# Patient Record
Sex: MALE | Race: WHITE | URBAN - METROPOLITAN AREA
[De-identification: names, ages, dates, MRNs, and addresses within clinical notes are randomized per-mention and may not be internally consistent; named-entity substitution may affect disease eponyms.]

---

## 2017-02-01 ENCOUNTER — RADIANT APPOINTMENT (OUTPATIENT)
Dept: GENERAL RADIOLOGY | Facility: CLINIC | Age: 22
End: 2017-02-01
Attending: FAMILY MEDICINE
Payer: MEDICAID

## 2017-02-01 ENCOUNTER — ALLIED HEALTH/NURSE VISIT (OUTPATIENT)
Dept: FAMILY MEDICINE | Facility: CLINIC | Age: 22
End: 2017-02-01
Payer: MEDICAID

## 2017-02-01 VITALS
TEMPERATURE: 99.4 F | HEART RATE: 97 BPM | HEIGHT: 72 IN | SYSTOLIC BLOOD PRESSURE: 108 MMHG | OXYGEN SATURATION: 97 % | WEIGHT: 242.3 LBS | RESPIRATION RATE: 16 BRPM | BODY MASS INDEX: 32.82 KG/M2 | DIASTOLIC BLOOD PRESSURE: 66 MMHG

## 2017-02-01 DIAGNOSIS — R07.89 ATYPICAL CHEST PAIN: ICD-10-CM

## 2017-02-01 DIAGNOSIS — R11.2 NAUSEA AND VOMITING, INTRACTABILITY OF VOMITING NOT SPECIFIED, UNSPECIFIED VOMITING TYPE: ICD-10-CM

## 2017-02-01 DIAGNOSIS — R68.89 FLU-LIKE SYMPTOMS: Primary | ICD-10-CM

## 2017-02-01 DIAGNOSIS — R07.0 THROAT PAIN: ICD-10-CM

## 2017-02-01 DIAGNOSIS — J10.1 INFLUENZA B: ICD-10-CM

## 2017-02-01 DIAGNOSIS — R68.89 FLU-LIKE SYMPTOMS: ICD-10-CM

## 2017-02-01 DIAGNOSIS — J45.901 ASTHMA WITH ACUTE EXACERBATION, UNSPECIFIED ASTHMA SEVERITY: ICD-10-CM

## 2017-02-01 LAB
DEPRECATED S PYO AG THROAT QL EIA: NORMAL
FLUAV+FLUBV AG SPEC QL: ABNORMAL
FLUAV+FLUBV AG SPEC QL: ABNORMAL
MICRO REPORT STATUS: NORMAL
SPECIMEN SOURCE: ABNORMAL
SPECIMEN SOURCE: NORMAL

## 2017-02-01 PROCEDURE — 87804 INFLUENZA ASSAY W/OPTIC: CPT | Performed by: FAMILY MEDICINE

## 2017-02-01 PROCEDURE — 87081 CULTURE SCREEN ONLY: CPT | Performed by: FAMILY MEDICINE

## 2017-02-01 PROCEDURE — 87880 STREP A ASSAY W/OPTIC: CPT | Performed by: FAMILY MEDICINE

## 2017-02-01 PROCEDURE — 71020 XR CHEST 2 VW: CPT

## 2017-02-01 PROCEDURE — 99214 OFFICE O/P EST MOD 30 MIN: CPT | Performed by: FAMILY MEDICINE

## 2017-02-01 RX ORDER — ALBUTEROL SULFATE 90 UG/1
2 AEROSOL, METERED RESPIRATORY (INHALATION) EVERY 6 HOURS PRN
Qty: 1 INHALER | Refills: 0 | Status: SHIPPED | OUTPATIENT
Start: 2017-02-01

## 2017-02-01 RX ORDER — ALBUTEROL SULFATE 0.83 MG/ML
1 SOLUTION RESPIRATORY (INHALATION) EVERY 4 HOURS PRN
Qty: 25 VIAL | Refills: 1 | Status: SHIPPED | OUTPATIENT
Start: 2017-02-01

## 2017-02-01 RX ORDER — ONDANSETRON 4 MG/1
4-8 TABLET, ORALLY DISINTEGRATING ORAL EVERY 8 HOURS PRN
Qty: 20 TABLET | Refills: 1 | Status: SHIPPED | OUTPATIENT
Start: 2017-02-01

## 2017-02-01 ASSESSMENT — PAIN SCALES - GENERAL: PAINLEVEL: EXTREME PAIN (9)

## 2017-02-01 NOTE — PATIENT INSTRUCTIONS
Drink 1 teaspoon of water every 5 minutes .     Take 1-2 tablet of Zofran every 8 hours as needed for nausea.    Take Ibuprofen for symptom management.     Take 1 capsule(20mg) of Prilosec daily for discomfort with swallowing.     Use albuterol nebulizer every 4 hours as needed.     If symptoms worsen, follow up.

## 2017-02-01 NOTE — PROGRESS NOTES
SUBJECTIVE:                                                    Manan Lay is a 21 year old male who presents to clinic today for the following health issues:  Patient walked into the clinic today with the following concerns. He was double booked into the schedule    Acute Illness   Acute illness concerns: flu sx's  Onset: 1/28/17 -      Fever: YES-     Chills/Sweats: YES    Headache (location?): YES    Sinus Pressure:YES    Conjunctivitis:  no    Ear Pain: YES-     Rhinorrhea: no     Congestion: no     Sore Throat: YES     Cough: YES-non-productive    Wheeze: YES    Decreased Appetite: YES    Nausea: YES    Vomiting: YES    Diarrhea:  YES    Dysuria/Freq.: no     Fatigue/Achiness: YES    Sick/Strep Exposure: no     Therapies Tried and outcome: Tylenol - no help, couldn't keep it down    Manan has been feeling ill since Saturday night(4 days ago).  He has been experiencing headaches, decrease appetite,cough,sore threat, body aches, vomiting, and sharp stabbing chest pain, and pain when he swallows. The pain feels like heartburn he has had in the past..The chest pain improves with leaning back but laying down at night makes it worse.      He is vomiting 2-3x a day.He is unable to keep down food and medication down but he is able to keep down small sips of water. He is not urinating frequently- he urinated 1-2x yesterday and his urine stream was decreased and dark. He is having diarrhea about 1x a day.     Highest temp was 101 on Saturday night.     Hx of asthma. He has been wheezing and experiencing SOB. Has not been taking Flovent or using albuterol inhaler.     Additional Notes  -He has a wisdom tooth coming in and states it is causing some of his headaches.     Problem list and histories reviewed & adjusted, as indicated.  Additional history: as documented    Patient Active Problem List   Diagnosis     Asthma exacerbation     No past surgical history on file.    Social History   Substance Use Topics      Smoking status: Never Smoker      Smokeless tobacco: Not on file     Alcohol Use: Not on file     No family history on file.      Current Outpatient Prescriptions   Medication Sig Dispense Refill     ibuprofen (ADVIL,MOTRIN) 800 MG tablet Take 1 tablet (800 mg) by mouth every 8 hours as needed for moderate pain 30 tablet 1     MAGIC MOUTHWASH, ENTER INGREDIENTS IN COMMENTS, Swish and spit 5-10 mLs in mouth every 6 hours as needed Pharmacy please compound 4 grams of carafate susp in 30 ml of Benadryl (12.5 mg/5 ml), 60 ml Maalox and 30 ml Viscous Lidocaine 160 mL 0     traMADol (ULTRAM) 50 MG tablet Take 1 tablet (50 mg) by mouth every 6 hours as needed for pain (knee pain) 28 tablet 0     fluticasone (FLOVENT HFA) 220 MCG/ACT inhaler Inhale 2 puffs into the lungs 2 times daily       albuterol (PROAIR HFA, PROVENTIL HFA, VENTOLIN HFA) 108 (90 BASE) MCG/ACT inhaler Inhale 2 puffs into the lungs every 6 hours as needed for shortness of breath / dyspnea or wheezing 1 Inhaler 0     montelukast (SINGULAIR) 10 MG tablet Take 1 tablet (10 mg) by mouth every evening 30 tablet 0     cetirizine (ZYRTEC) 10 MG tablet Take 10 mg by mouth every morning       ibuprofen (ADVIL,MOTRIN) 200 MG tablet Take 400-600 mg by mouth every 8 hours as needed for mild pain       Allergies   Allergen Reactions     Morphine        ROS:  Constitutional, HEENT, cardiovascular, pulmonary, gi and gu systems are negative, except as otherwise noted.    OBJECTIVE:                                                    /66 mmHg  Pulse 97  Temp(Src) 99.4  F (37.4  C) (Oral)  Resp 16  Ht 1.829 m (6')  Wt 109.907 kg (242 lb 4.8 oz)  BMI 32.85 kg/m2  SpO2 97%  Body mass index is 32.85 kg/(m^2).  GENERAL: healthy, alert and no distress  HENT: ear canals normal, fluid present behind ear drums bilaterally, nasal mucosal edema with whitish discharge present and posterior pharynx erythema.   NECK: shotty anterior and posterior cervical adenopathy, no  asymmetry, masses, or scars and thyroid normal to palpation  RESP: expiratory wheezing-left lower lung field - no rales, rhonchi. No retractions, able to talk in complete sentences  CV: regular rate and rhythm, normal S1 S2, no S3 or S4, no murmur, click or rub, no peripheral edema and peripheral pulses strong  ABDOMEN: soft, mild ambrosio umbilical tenderness to palpation-no guarding or rebound, no hepatosplenomegaly, no masses and bowel sounds normal  MS: no gross musculoskeletal defects noted, no edema  PSYCH: mentation appears normal, affect normal/bright      Diagnostic Test Results:  Results for orders placed or performed in visit on 02/01/17 (from the past 24 hour(s))   Influenza A/B antigen   Result Value Ref Range    Influenza A/B Agn Specimen Nasal     Influenza A  NEG     Negative   Test results must be correlated with clinical data. If necessary, results   should be confirmed by a molecular assay or viral culture.      Influenza B (A) NEG     Positive   Test results must be correlated with clinical data. If necessary, results   should be confirmed by a molecular assay or viral culture.     Strep, Rapid Screen   Result Value Ref Range    Specimen Description Throat     Rapid Strep A Screen       NEGATIVE: No Group A streptococcal antigen detected by immunoassay, await   culture report.      Micro Report Status FINAL 02/01/2017        Chest X-ray: Negative. Xray personally reviewed and evaluated by me       ASSESSMENT/PLAN:                                                        1. Flu-like symptoms- INFLUENZA B on Testing  2. Throat pain  3. Atypical chest pain  Discussed symptomatic sx management with Ibuprofen, Prilosec, and Zofran. Reviewed importance of frequent hydration with small sips of water, popsicles, or ice chips to prevent dehydration. May need ER and IV fluid hydration if not able to get more fluids in. Reviewed symptomatic management of symptoms. Patient education provided, including expected  course of illness and symptoms that may occur which would require urgent evalution. All questions answered.   Patient understands and agrees with plan.    - Influenza A/B antigen  - XR Chest 2 Views; Future  - albuterol (2.5 MG/3ML) 0.083% neb solution; Take 1 vial (2.5 mg) by nebulization every 4 hours as needed for shortness of breath / dyspnea or wheezing  Dispense: 25 vial; Refill: 1  - Strep, Rapid Screen  - Beta strep group A culture  - omeprazole (PRILOSEC) 20 MG CR capsule; Take 1 capsule (20 mg) by mouth daily  Dispense: 30 capsule; Refill: 1    4. Nausea and vomiting, intractability of vomiting not specified, unspecified vomiting type  Will treat with Zofran. Reviewed symptom management as above.   - ondansetron (ZOFRAN ODT) 4 MG ODT tab; Take 1-2 tablets (4-8 mg) by mouth every 8 hours as needed for nausea  Dispense: 20 tablet; Refill: 1  - omeprazole (PRILOSEC) 20 MG CR capsule; Take 1 capsule (20 mg) by mouth daily  Dispense: 30 capsule; Refill: 1    5. Asthma with acute exacerbation, unspecified asthma severity  Use albuterol inhaler PRN for SOB and cough. Restart flovent.   - albuterol (PROAIR HFA/PROVENTIL HFA/VENTOLIN HFA) 108 (90 BASE) MCG/ACT Inhaler; Inhale 2 puffs into the lungs every 6 hours as needed for shortness of breath / dyspnea or wheezing  Dispense: 1 Inhaler; Refill: 0  - albuterol (2.5 MG/3ML) 0.083% neb solution; Take 1 vial (2.5 mg) by nebulization every 4 hours as needed for shortness of breath / dyspnea or wheezing  Dispense: 25 vial; Refill: 1    See Patient Instructions  Patient Instructions   Drink 1 teaspoon of water every 5 minutes .     Take 1-2 tablet of Zofran every 8 hours as needed for nausea.    Take Ibuprofen for symptom management.     Take 1 capsule(20mg) of Prilosec daily for discomfort with swallowing.     Use albuterol nebulizer every 4 hours as needed.     If symptoms worsen, follow up.                   This document serves as a record of the services and  decisions personally performed and made by Danielle Joy MD. It was created on her behalf by Jasmina Sidhu,a trained medical scribe. The creation of this document is based the provider's statements to the medical scribe.  Jasmina Sidhu February 1, 2017 11:20 AM       Danielle Joy MD  [facility]

## 2017-02-01 NOTE — NURSING NOTE
Chief Complaint   Patient presents with     Chest Pain     1/29/17 -      Flu     sx's       Initial /66 mmHg  Pulse 97  Temp(Src) 99.4  F (37.4  C) (Oral)  Resp 16  Ht 1.829 m (6')  Wt 109.907 kg (242 lb 4.8 oz)  BMI 32.85 kg/m2  SpO2 97% Estimated body mass index is 32.85 kg/(m^2) as calculated from the following:    Height as of this encounter: 1.829 m (6').    Weight as of this encounter: 109.907 kg (242 lb 4.8 oz).  BP completed using cuff size: ricarda Swartz MA      
---

## 2017-02-01 NOTE — MR AVS SNAPSHOT
"              After Visit Summary   2/1/2017    Manan Lay    MRN: [id number]           Patient Information     Date Of Birth          1995        Visit Information        Provider Department      2/1/2017 10:40 AM Danielle Joy MD [facility]        Today's Diagnoses     Flu-like symptoms    -  1     Throat pain         Atypical chest pain         Nausea and vomiting, intractability of vomiting not specified, unspecified vomiting type         Asthma with acute exacerbation, unspecified asthma severity           Care Instructions    Drink 1 teaspoon of water every 5 minutes .     Take 1-2 tablet of Zofran every 8 hours as needed for nausea.    Take Ibuprofen for symptom management.     Take 1 capsule(20mg) of Prilosec daily for discomfort with swallowing.     Use albuterol nebulizer every 4 hours as needed.     If symptoms worsen, follow up.                     Follow-ups after your visit        Who to contact     If you have questions or need follow up information about today's clinic visit or your schedule please contact [facility] directly at [phone].  Normal or non-critical lab and imaging results will be communicated to you by Mingleversehart, letter or phone within 4 business days after the clinic has received the results. If you do not hear from us within 7 days, please contact the clinic through HALSCIONt or phone. If you have a critical or abnormal lab result, we will notify you by phone as soon as possible.  Submit refill requests through Conversocial or call your pharmacy and they will forward the refill request to us. Please allow 3 business days for your refill to be completed.          Additional Information About Your Visit        Mingleversehart Information     Conversocial lets you send messages to your doctor, view your test results, renew your prescriptions, schedule appointments and more. To sign up, go to [email or web address] . Click on \"Log in\" on the left " "side of the screen, which will take you to the Welcome page. Then click on \"Sign up Now\" on the right side of the page.     You will be asked to enter the access code listed below, as well as some personal information. Please follow the directions to create your username and password.     Your access code is: [email or web address]  Expires: 2017 11:50 AM     Your access code will  in 90 days. If you need help or a new code, please call your [facility] clinic or [phone].        Care EveryWhere ID     This is your Care EveryWhere ID. This could be used by other organizations to access your [facility] medical records  [id number]        Your Vitals Were     Pulse Temperature Respirations Height BMI (Body Mass Index) Pulse Oximetry    97 99.4  F (37.4  C) (Oral) 16 1.829 m (6') 32.85 kg/m2 97%       Blood Pressure from Last 3 Encounters:   17 108/66   03/15/16 116/68   16 139/74    Weight from Last 3 Encounters:   17 109.907 kg (242 lb 4.8 oz)   03/15/16 112.946 kg (249 lb)   16 111.3 kg (245 lb 6 oz)              We Performed the Following     Beta strep group A culture     Influenza A/B antigen     Strep, Rapid Screen          Today's Medication Changes          These changes are accurate as of: 17 11:50 AM.  If you have any questions, ask your nurse or doctor.               Start taking these medicines.        Dose/Directions    omeprazole 20 MG CR capsule   Commonly known as:  priLOSEC   Used for:  Atypical chest pain, Nausea and vomiting, intractability of vomiting not specified, unspecified vomiting type   Started by:  Danielle Joy MD        Dose:  20 mg   Take 1 capsule (20 mg) by mouth daily   Quantity:  30 capsule   Refills:  1       ondansetron 4 MG ODT tab   Commonly known as:  ZOFRAN ODT   Used for:  Nausea and vomiting, intractability of vomiting not specified, unspecified vomiting type   Started by:  Danielle Joy MD        Dose:  4-8 mg   Take 1-2 " tablets (4-8 mg) by mouth every 8 hours as needed for nausea   Quantity:  20 tablet   Refills:  1         These medicines have changed or have updated prescriptions.        Dose/Directions    * albuterol 108 (90 BASE) MCG/ACT Inhaler   Commonly known as:  PROAIR HFA/PROVENTIL HFA/VENTOLIN HFA   This may have changed:  Another medication with the same name was added. Make sure you understand how and when to take each.   Used for:  Asthma with acute exacerbation, unspecified asthma severity   Changed by:  Danielle Joy MD        Dose:  2 puff   Inhale 2 puffs into the lungs every 6 hours as needed for shortness of breath / dyspnea or wheezing   Quantity:  1 Inhaler   Refills:  0       * albuterol (2.5 MG/3ML) 0.083% neb solution   This may have changed:  You were already taking a medication with the same name, and this prescription was added. Make sure you understand how and when to take each.   Used for:  Flu-like symptoms, Asthma with acute exacerbation, unspecified asthma severity   Changed by:  Danielle Joy MD        Dose:  1 vial   Take 1 vial (2.5 mg) by nebulization every 4 hours as needed for shortness of breath / dyspnea or wheezing   Quantity:  25 vial   Refills:  1       * Notice:  This list has 2 medication(s) that are the same as other medications prescribed for you. Read the directions carefully, and ask your doctor or other care provider to review them with you.         Where to get your medicines      These medications were sent to [facility]/pharmacy #[id number] - [facility] [address] [place] [address], [place] AT [address] [address]     Phone:  [phone]    - albuterol (2.5 MG/3ML) 0.083% neb solution  - albuterol 108 (90 BASE) MCG/ACT Inhaler  - omeprazole 20 MG CR capsule  - ondansetron 4 MG ODT tab             Primary Care Provider    None Doctor, MD       No address on file        Thank you!     Thank you for choosing [place]  [facility]  for your care. Our goal is always to provide you with excellent care. Hearing back from our patients is one way we can continue to improve our services. Please take a few minutes to complete the written survey that you may receive in the mail after your visit with us. Thank you!             Your Updated Medication List - Protect others around you: Learn how to safely use, store and throw away your medicines at www.disposemymeds.org.          This list is accurate as of: 2/1/17 11:50 AM.  Always use your most recent med list.                   Brand Name Dispense Instructions for use    * albuterol 108 (90 BASE) MCG/ACT Inhaler    PROAIR HFA/PROVENTIL HFA/VENTOLIN HFA    1 Inhaler    Inhale 2 puffs into the lungs every 6 hours as needed for shortness of breath / dyspnea or wheezing       * albuterol (2.5 MG/3ML) 0.083% neb solution     25 vial    Take 1 vial (2.5 mg) by nebulization every 4 hours as needed for shortness of breath / dyspnea or wheezing       cetirizine 10 MG tablet    zyrTEC     Take 10 mg by mouth every morning       fluticasone 220 MCG/ACT Inhaler    FLOVENT HFA     Inhale 2 puffs into the lungs 2 times daily       * ibuprofen 200 MG tablet    ADVIL/MOTRIN     Take 400-600 mg by mouth every 8 hours as needed for mild pain       * ibuprofen 800 MG tablet    ADVIL/MOTRIN    30 tablet    Take 1 tablet (800 mg) by mouth every 8 hours as needed for moderate pain       MAGIC MOUTHWASH (ENTER INGREDIENTS IN COMMENTS)     160 mL    Swish and spit 5-10 mLs in mouth every 6 hours as needed Pharmacy please compound 4 grams of carafate susp in 30 ml of Benadryl (12.5 mg/5 ml), 60 ml Maalox and 30 ml Viscous Lidocaine       montelukast 10 MG tablet    SINGULAIR    30 tablet    Take 1 tablet (10 mg) by mouth every evening       omeprazole 20 MG CR capsule    priLOSEC    30 capsule    Take 1 capsule (20 mg) by mouth daily       ondansetron 4 MG ODT tab    ZOFRAN ODT    20 tablet    Take 1-2  tablets (4-8 mg) by mouth every 8 hours as needed for nausea       traMADol 50 MG tablet    ULTRAM    28 tablet    Take 1 tablet (50 mg) by mouth every 6 hours as needed for pain (knee pain)       * Notice:  This list has 4 medication(s) that are the same as other medications prescribed for you. Read the directions carefully, and ask your doctor or other care provider to review them with you.

## 2017-02-02 PROBLEM — J45.40 MODERATE PERSISTENT ASTHMA: Status: ACTIVE | Noted: 2017-02-02

## 2017-02-03 ENCOUNTER — HOSPITAL ENCOUNTER (EMERGENCY)
Facility: CLINIC | Age: 22
Discharge: HOME | End: 2017-02-03
Attending: EMERGENCY MEDICINE | Admitting: EMERGENCY MEDICINE
Payer: MEDICAID

## 2017-02-03 ENCOUNTER — APPOINTMENT (OUTPATIENT)
Dept: GENERAL RADIOLOGY | Facility: CLINIC | Age: 22
End: 2017-02-03
Attending: EMERGENCY MEDICINE
Payer: MEDICAID

## 2017-02-03 VITALS
HEART RATE: 89 BPM | OXYGEN SATURATION: 97 % | TEMPERATURE: 98.3 F | DIASTOLIC BLOOD PRESSURE: 76 MMHG | SYSTOLIC BLOOD PRESSURE: 123 MMHG | RESPIRATION RATE: 18 BRPM

## 2017-02-03 DIAGNOSIS — R07.9 ACUTE CHEST PAIN: ICD-10-CM

## 2017-02-03 DIAGNOSIS — K20.90 ESOPHAGITIS: ICD-10-CM

## 2017-02-03 DIAGNOSIS — J10.1 INFLUENZA B: ICD-10-CM

## 2017-02-03 LAB
ANION GAP SERPL CALCULATED.3IONS-SCNC: 4 MMOL/L (ref 3–14)
BACTERIA SPEC CULT: NORMAL
BASOPHILS # BLD AUTO: 0 10E9/L (ref 0–0.2)
BASOPHILS NFR BLD AUTO: 0.5 %
BUN SERPL-MCNC: 14 MG/DL (ref 7–30)
CALCIUM SERPL-MCNC: 9.2 MG/DL (ref 8.5–10.1)
CHLORIDE SERPL-SCNC: 105 MMOL/L (ref 94–109)
CO2 SERPL-SCNC: 31 MMOL/L (ref 20–32)
CREAT SERPL-MCNC: 1.01 MG/DL (ref 0.66–1.25)
DIFFERENTIAL METHOD BLD: ABNORMAL
EOSINOPHIL # BLD AUTO: 0 10E9/L (ref 0–0.7)
EOSINOPHIL NFR BLD AUTO: 0.6 %
ERYTHROCYTE [DISTWIDTH] IN BLOOD BY AUTOMATED COUNT: 11.9 % (ref 10–15)
GFR SERPL CREATININE-BSD FRML MDRD: NORMAL ML/MIN/1.7M2
GLUCOSE SERPL-MCNC: 89 MG/DL (ref 70–99)
HCT VFR BLD AUTO: 46.3 % (ref 40–53)
HGB BLD-MCNC: 16.2 G/DL (ref 13.3–17.7)
IMM GRANULOCYTES # BLD: 0 10E9/L (ref 0–0.4)
IMM GRANULOCYTES NFR BLD: 0.2 %
INTERPRETATION ECG - MUSE: NORMAL
LYMPHOCYTES # BLD AUTO: 1.2 10E9/L (ref 0.8–5.3)
LYMPHOCYTES NFR BLD AUTO: 18.5 %
MCH RBC QN AUTO: 30.9 PG (ref 26.5–33)
MCHC RBC AUTO-ENTMCNC: 35 G/DL (ref 31.5–36.5)
MCV RBC AUTO: 88 FL (ref 78–100)
MICRO REPORT STATUS: NORMAL
MONOCYTES # BLD AUTO: 0.7 10E9/L (ref 0–1.3)
MONOCYTES NFR BLD AUTO: 11.8 %
NEUTROPHILS # BLD AUTO: 4.3 10E9/L (ref 1.6–8.3)
NEUTROPHILS NFR BLD AUTO: 68.4 %
NRBC # BLD AUTO: 0 10*3/UL
NRBC BLD AUTO-RTO: 0 /100
PLATELET # BLD AUTO: 129 10E9/L (ref 150–450)
PLATELET # BLD EST: NORMAL 10*3/UL
POTASSIUM SERPL-SCNC: 3.9 MMOL/L (ref 3.4–5.3)
RBC # BLD AUTO: 5.25 10E12/L (ref 4.4–5.9)
RBC MORPH BLD: ABNORMAL
SODIUM SERPL-SCNC: 140 MMOL/L (ref 133–144)
SPECIMEN SOURCE: NORMAL
VARIANT LYMPHS BLD QL SMEAR: PRESENT
WBC # BLD AUTO: 6.2 10E9/L (ref 4–11)

## 2017-02-03 PROCEDURE — 71020 XR CHEST 2 VW: CPT

## 2017-02-03 PROCEDURE — 96361 HYDRATE IV INFUSION ADD-ON: CPT

## 2017-02-03 PROCEDURE — 99285 EMERGENCY DEPT VISIT HI MDM: CPT | Mod: 25

## 2017-02-03 PROCEDURE — 93005 ELECTROCARDIOGRAM TRACING: CPT

## 2017-02-03 PROCEDURE — 80048 BASIC METABOLIC PNL TOTAL CA: CPT | Performed by: EMERGENCY MEDICINE

## 2017-02-03 PROCEDURE — 25000132 ZZH RX MED GY IP 250 OP 250 PS 637: Performed by: EMERGENCY MEDICINE

## 2017-02-03 PROCEDURE — 96374 THER/PROPH/DIAG INJ IV PUSH: CPT

## 2017-02-03 PROCEDURE — 25000125 ZZHC RX 250: Performed by: EMERGENCY MEDICINE

## 2017-02-03 PROCEDURE — 85025 COMPLETE CBC W/AUTO DIFF WBC: CPT | Performed by: EMERGENCY MEDICINE

## 2017-02-03 PROCEDURE — 25000128 H RX IP 250 OP 636: Performed by: EMERGENCY MEDICINE

## 2017-02-03 RX ORDER — ONDANSETRON 2 MG/ML
4 INJECTION INTRAMUSCULAR; INTRAVENOUS ONCE
Status: COMPLETED | OUTPATIENT
Start: 2017-02-03 | End: 2017-02-03

## 2017-02-03 RX ADMIN — LIDOCAINE HYDROCHLORIDE 30 ML: 20 SOLUTION ORAL; TOPICAL at 11:22

## 2017-02-03 RX ADMIN — ONDANSETRON 4 MG: 2 INJECTION INTRAMUSCULAR; INTRAVENOUS at 11:27

## 2017-02-03 RX ADMIN — SODIUM CHLORIDE 1000 ML: 9 INJECTION, SOLUTION INTRAVENOUS at 11:27

## 2017-02-03 ASSESSMENT — ENCOUNTER SYMPTOMS
ABDOMINAL PAIN: 1
LIGHT-HEADEDNESS: 1
SHORTNESS OF BREATH: 0
COUGH: 1
VOMITING: 1
NAUSEA: 1
WHEEZING: 1
SORE THROAT: 1

## 2017-02-03 NOTE — ED AVS SNAPSHOT
[facility] Emergency [facility]    [address]    [facility] [address]    Phone:  [phone]    Fax:  [phone]                                       Manan Lay   MRN: [id number]    Department:  [facility] Emergency Department   Date of Visit:  2/3/2017           After Visit Summary Signature Page     I have received my discharge instructions, and my questions have been answered. I have discussed any challenges I see with this plan with the nurse or doctor.    ..........................................................................................................................................  Patient/Patient Representative Signature      ..........................................................................................................................................  Patient Representative Print Name and Relationship to Patient    ..................................................               ................................................  Date                                            Time    ..........................................................................................................................................  Reviewed by Signature/Title    ...................................................              ..............................................  Date                                                            Time

## 2017-02-03 NOTE — DISCHARGE INSTRUCTIONS
Prescription strength dosing instructions:  - 600mg ibuprofen (motrin, advil) every 6 hours and/or  - 650mg acetaminophen (tylenol) every 4 hours or 1000mg every 6 hours (maximum of 4000mg in 24 hours).  Acetaminophen is often in combination over the counter and prescription pain medications.  Be sure to include this in daily total.  - These work in different ways and are safe to take together    - medications ending in -williams (such as throat lozenges) contain numbing medications

## 2017-02-03 NOTE — ED NOTES
Patient reports woke up this morning with sharp & burning chest pain that hurts to swallow.  Patient diagnosed with influenza B on 2/1/2017 and unable to fill scripts d/t insurance. ABC's intact.

## 2017-02-03 NOTE — ED PROVIDER NOTES
History     Chief Complaint:  Sore Throat & Chest Pain; Nausea & Vomiting    HPI   Manan Lay is a 21 year old male who presents for evaluation of a sore throat, chest pain, nausea, and vomiting. The patient reports that he began feeling sick about a week ago. After going into the clinic 2 days ago, he was diagnosed with Influenza B. However, the patient has since been unable to get all of his prescriptions filled due to insurance reasons, though he has been taking some over the counter medications for symptom relief. The patient mentions that he has also felt nauseous and has been vomiting. The patient states that he woke up in pain at 0600 this morning with a sore throat and chest pain with swallowing, prompting his visit to the ED. Currently, he states that it is very painful for him to swallow, and he has a sharp, burning chest pain as well. The patient vomited once this morning, though he notes that this has been improving since the onset of his symptoms one week ago. The patient also notes that he has been coughing and noticed some wheezing on occasion. At this time, the patient also endorses abdominal pain and lightheadedness. The patient denies any shortness of breath.    Cardiac Risk Factors:  CAD:    Neg  Hypertension:   Neg  Hyperlipidemia:  Neg  Diabetes:   Neg  Tobacco use:   Pos (former smoker)  Gender:   M  Age:    21  Familial Hx of CAD:  Neg    Allergies:  Morphine     Medications:    Albuterol inhaler  Albuterol neb solution  Flovent inhaler  Singulair  Zyrtec  Zofran  Prilosec     Past Medical History:    Moderate persistent asthma    Past Surgical History:    History reviewed.  No significant past surgical history.     Family History:    History reviewed.  No significant family history.     Social History:  Relationship status: Single  Tobacco use: Former smoker (1 year)  Alcohol use: Neg (sober x3 weeks)  The patient presents alone.     Review of Systems   HENT: Positive for sore  throat.         Positive for dysphagia.   Respiratory: Positive for cough and wheezing. Negative for shortness of breath.    Cardiovascular: Positive for chest pain.   Gastrointestinal: Positive for nausea, vomiting and abdominal pain.   Neurological: Positive for light-headedness.   All other systems reviewed and are negative.    Physical Exam     Patient Vitals for the past 24 hrs:   BP Temp Temp src Pulse Heart Rate Resp SpO2   02/03/17 1235 123/76 mmHg - - - - - 97 %   02/03/17 1220 120/72 mmHg - - - - - 96 %   02/03/17 1205 125/75 mmHg - - - - - 98 %   02/03/17 1135 126/74 mmHg - - - 83 - 95 %   02/03/17 1120 134/83 mmHg - - - 84 - 97 %   02/03/17 1100 141/80 mmHg 98.3  F (36.8  C) Oral 89 - 18 97 %      Physical Exam  Eyes:  Sclera white; Pupils are equal and round  ENT:    External ears and nares normal    Oropharynx w/posterior cobblestoning, no tonsilar swelling or exudate  LN:  No tender adenopathy  CV:  Regular rate and rhythm, No murmur   Resp:  Breath sounds clear and equal bilaterally, no obvious wheeze    Non-labored, no retractions or accessory muscle use  GI:  Abdomen is soft, epigastric tenderness    No rebound tenderness or peritoneal features  MS:  Moves all extremities  Skin:  Warm and dry  Neuro: Speech is normal and fluent. No apparent deficit.        Emergency Department Course   ECG @ 1101  Indication: Chest pain.  Rate 89 bpm.   NH interval 134 ms.   QRS duration 96 ms.   QT/QTc 350/425 ms.   P-R-T axes -16.  Notes: Normal sinus rhythm. Normal ECG. No longer tachycardic versus 2/26/16.  Time read 1106    Imaging:  Radiographic findings were communicated with the patient who voiced understanding of the findings.  Chest XR, PA and LAT, per radiology:  Negative.    Laboratory:  CBC: WBC 6.2, HGB 16.2,  (L)  BMP: WNL (Creatinine 1.01)    Interventions:  1122 GI cocktail, 30 mL, PO  1127 Zofran, 4 mg, IV  1127 Normal Saline, 1000 mL, IV    Emergency Department Course:  Nursing notes and  vitals reviewed.  I performed an exam of the patient as documented above.  The above workup was undertaken.  1301: I rechecked the patient and discussed results.  Findings and plan explained to the Patient. Patient discharged home with instructions regarding supportive care, medications, and reasons to return. The importance of close follow-up was reviewed.    Impression & Plan      Medical Decision Making:  Tarun Bui is a 13 year old male who is here for evaluation of chest pain, worse with swallowing, in the setting of recent diagnosis of Influenza B with associated vomiting. Differential includes development of pneumonia, esophageal reflux, esophagitis, esophageal tears, and dehydration with associated electrolyte abnormalities or renal insuffiencey. Blood work was checked and was normal. Chest x-ray returned normal. The patient was feeling symptomatically improved after a GI cocktail. In addition to the medication he is already on, he will be started on Ranitidine, as this will act faster than the Omeprazole he was started on, as well as a GI cocktail to use at home. Discussed risk of cardiac effects if used more than prescribed.  He will return immediately for any worsening or will follow up in clinic.    Diagnosis:    ICD-10-CM   1. Acute chest pain R07.9   2. Esophagitis K20.9   3. Influenza B J10.1     Disposition:  Discharge to home with primary care follow up.     Discharge Medications:   Details   ranitidine (ZANTAC) 150 MG tablet Take 1 tablet (150 mg) by mouth 2 times daily for 7 days, Disp-14 tablet, R-0, Local Print      lidocaine 15 mL, alum & mag hydroxide-simethicone 15 mL GI Cocktail Take 30 mLs by mouth 3 times daily as needed, 30 mL, Oral, 3 TIMES DAILY PRN Starting 2/3/2017, Until Discontinued, Disp-240 mL, R-0, Local Print         [place], am serving as a scribe on 2/3/2017 at 11:10 AM to personally document services performed by Mala Blackwood MD, based on my  observations and the provider's statements to me.    [facility] EMERGENCY DEPARTMENT        Mala Blackwood MD  02/03/17 2282

## 2017-02-03 NOTE — ED AVS SNAPSHOT
[facility] Emergency Department    [address]    [facility] [address]    Phone:  [phone]    Fax:  [phone]                                       Manan Lay   MRN: [id number]    Department:  [facility] Emergency Department   Date of Visit:  2/3/2017           Patient Information     Date Of Birth          1995        Your diagnoses for this visit were:     Acute chest pain     Esophagitis     Influenza B        You were seen by Mala Blackwood MD.      Follow-up Information     Follow up with [facility].    Specialty:  EMERGENCY MEDICINE    Why:  If symptoms worsen    Contact information:    [address]  [facility] [address] [phone]        Discharge Instructions       Prescription strength dosing instructions:  - 600mg ibuprofen (motrin, advil) every 6 hours and/or  - 650mg acetaminophen (tylenol) every 4 hours or 1000mg every 6 hours (maximum of 4000mg in 24 hours).  Acetaminophen is often in combination over the counter and prescription pain medications.  Be sure to include this in daily total.  - These work in different ways and are safe to take together    - medications ending in -williams (such as throat lozenges) contain numbing medications        Discharge References/Attachments     ESOPHAGITIS (ENGLISH)    CHEST WALL PAIN, COSTOCHONDRITIS (ENGLISH)      24 Hour Appointment Hotline       To make an appointment at any [place] clinic, call [phone] ([phone]). If you don't have a family doctor or clinic, we will help you find one. [place] clinics are conveniently located to serve the needs of you and your family.             Review of your medicines      START taking        Dose / Directions Last dose taken    lidocaine 15 mL, alum & mag hydroxide-simethicone 15 mL GI Cocktail   Dose:  30 mL   Quantity:  240 mL        Take 30 mLs by mouth 3 times daily as needed   Refills:   0        ranitidine 150 MG tablet   Commonly known as:  ZANTAC   Dose:  150 mg   Quantity:  14 tablet        Take 1 tablet (150 mg) by mouth 2 times daily for 7 days   Refills:  0          Our records show that you are taking the medicines listed below. If these are incorrect, please call your family doctor or clinic.        Dose / Directions Last dose taken    * albuterol 108 (90 BASE) MCG/ACT Inhaler   Commonly known as:  PROAIR HFA/PROVENTIL HFA/VENTOLIN HFA   Dose:  2 puff   Quantity:  1 Inhaler        Inhale 2 puffs into the lungs every 6 hours as needed for shortness of breath / dyspnea or wheezing   Refills:  0        * albuterol (2.5 MG/3ML) 0.083% neb solution   Dose:  1 vial   Quantity:  25 vial        Take 1 vial (2.5 mg) by nebulization every 4 hours as needed for shortness of breath / dyspnea or wheezing   Refills:  1        cetirizine 10 MG tablet   Commonly known as:  zyrTEC   Dose:  10 mg        Take 10 mg by mouth every morning   Refills:  0        fluticasone 220 MCG/ACT Inhaler   Commonly known as:  FLOVENT HFA   Dose:  2 puff        Inhale 2 puffs into the lungs 2 times daily   Refills:  0        montelukast 10 MG tablet   Commonly known as:  SINGULAIR   Dose:  10 mg   Indication:  Asthma   Quantity:  30 tablet        Take 1 tablet (10 mg) by mouth every evening   Refills:  0        omeprazole 20 MG CR capsule   Commonly known as:  priLOSEC   Dose:  20 mg   Quantity:  30 capsule        Take 1 capsule (20 mg) by mouth daily   Refills:  1        ondansetron 4 MG ODT tab   Commonly known as:  ZOFRAN ODT   Dose:  4-8 mg   Quantity:  20 tablet        Take 1-2 tablets (4-8 mg) by mouth every 8 hours as needed for nausea   Refills:  1        * Notice:  This list has 2 medication(s) that are the same as other medications prescribed for you. Read the directions carefully, and ask your doctor or other care provider to review them with you.            Prescriptions were sent or printed at these locations (2  Prescriptions)                   Other Prescriptions                Printed at Department/Unit printer (2 of 2)         ranitidine (ZANTAC) 150 MG tablet               lidocaine 15 mL, alum & mag hydroxide-simethicone 15 mL GI Cocktail                Procedures and tests performed during your visit     Basic metabolic panel    CBC with platelets differential    Chest XR,  PA & LAT    EKG 12-lead, tracing only    Peripheral IV catheter      Orders Needing Specimen Collection     None      Pending Results     No orders found from 2/2/2017 to 2/4/2017.            Pending Culture Results     No orders found from 2/2/2017 to 2/4/2017.       Test Results from your hospital stay           2/3/2017 12:44 PM - Interface, Flexilab Results      Component Results     Component Value Ref Range & Units Status    WBC 6.2 4.0 - 11.0 10e9/L Final    RBC Count 5.25 4.4 - 5.9 10e12/L Final    Hemoglobin 16.2 13.3 - 17.7 g/dL Final    Hematocrit 46.3 40.0 - 53.0 % Final    MCV 88 78 - 100 fl Final    MCH 30.9 26.5 - 33.0 pg Final    MCHC 35.0 31.5 - 36.5 g/dL Final    RDW 11.9 10.0 - 15.0 % Final    Platelet Count 129 (L) 150 - 450 10e9/L Final    Diff Method Automated Method  Final    % Neutrophils 68.4 % Final    % Lymphocytes 18.5 % Final    % Monocytes 11.8 % Final    % Eosinophils 0.6 % Final    % Basophils 0.5 % Final    % Immature Granulocytes 0.2 % Final    Nucleated RBCs 0 0 /100 Final    Absolute Neutrophil 4.3 1.6 - 8.3 10e9/L Final    Absolute Lymphocytes 1.2 0.8 - 5.3 10e9/L Final    Absolute Monocytes 0.7 0.0 - 1.3 10e9/L Final    Absolute Eosinophils 0.0 0.0 - 0.7 10e9/L Final    Absolute Basophils 0.0 0.0 - 0.2 10e9/L Final    Abs Immature Granulocytes 0.0 0 - 0.4 10e9/L Final    Absolute Nucleated RBC 0.0  Final    Reactive Lymphs Present  Final    RBC Morphology   Final    Consistent with reported results    Platelet Estimate Normal  Final         2/3/2017 11:55 AM - Interface, Flexilab Results      Component Results      Component Value Ref Range & Units Status    Sodium 140 133 - 144 mmol/L Final    Potassium 3.9 3.4 - 5.3 mmol/L Final    Chloride 105 94 - 109 mmol/L Final    Carbon Dioxide 31 20 - 32 mmol/L Final    Anion Gap 4 3 - 14 mmol/L Final    Glucose 89 70 - 99 mg/dL Final    Urea Nitrogen 14 7 - 30 mg/dL Final    Creatinine 1.01 0.66 - 1.25 mg/dL Final    GFR Estimate >90  Non  GFR Calc   >60 mL/min/1.7m2 Final    GFR Estimate If Black >90   GFR Calc   >60 mL/min/1.7m2 Final    Calcium 9.2 8.5 - 10.1 mg/dL Final         2/3/2017 12:26 PM - Interface, Radiant Ib      Narrative     XR CHEST 2 VW 2/3/2017 12:25 PM    HISTORY: chest pain, cough, recent dx influenza but feeling worse        Impression     IMPRESSION: Negative.    TANA THOMPSON MD                Clinical Quality Measure: Blood Pressure Screening     Your blood pressure was checked while you were in the emergency department today. The last reading we obtained was  BP: 123/76 mmHg . Please read the guidelines below about what these numbers mean and what you should do about them.  If your systolic blood pressure (the top number) is less than 120 and your diastolic blood pressure (the bottom number) is less than 80, then your blood pressure is normal. There is nothing more that you need to do about it.  If your systolic blood pressure (the top number) is 120-139 or your diastolic blood pressure (the bottom number) is 80-89, your blood pressure may be higher than it should be. You should have your blood pressure rechecked within a year by a primary care provider.  If your systolic blood pressure (the top number) is 140 or greater or your diastolic blood pressure (the bottom number) is 90 or greater, you may have high blood pressure. High blood pressure is treatable, but if left untreated over time it can put you at risk for heart attack, stroke, or kidney failure. You should have your blood pressure rechecked by a primary care  "provider within the next 4 weeks.  If your provider in the emergency department today gave you specific instructions to follow-up with your doctor or provider even sooner than that, you should follow that instruction and not wait for up to 4 weeks for your follow-up visit.        Thank you for choosing [facility]       Thank you for choosing [facility] for your care. Our goal is always to provide you with excellent care. Hearing back from our patients is one way we can continue to improve our services. Please take a few minutes to complete the written survey that you may receive in the mail after you visit with us. Thank you!        [employer] Information     [employer] lets you send messages to your doctor, view your test results, renew your prescriptions, schedule appointments and more. To sign up, go to [email or web address] . Click on \"Log in\" on the left side of the screen, which will take you to the Welcome page. Then click on \"Sign up Now\" on the right side of the page.     You will be asked to enter the access code listed below, as well as some personal information. Please follow the directions to create your username and password.     Your access code is: [email or web address]  Expires: 2017 11:50 AM     Your access code will  in 90 days. If you need help or a new code, please call your [facility] clinic or [phone].        Care EveryWhere ID     This is your Care EveryWhere ID. This could be used by other organizations to access your [facility] medical records  [id number]        After Visit Summary       This is your record. Keep this with you and show to your community pharmacist(s) and doctor(s) at your next visit.                  "

## 2017-02-06 ENCOUNTER — TELEPHONE (OUTPATIENT)
Dept: FAMILY MEDICINE | Facility: CLINIC | Age: 22
End: 2017-02-06

## 2017-02-06 DIAGNOSIS — R07.89 ATYPICAL CHEST PAIN: Primary | ICD-10-CM

## 2017-02-06 NOTE — TELEPHONE ENCOUNTER
Ok for one refill of the lidocaine. No pharmacy listed. RN can send pended rx  Make sure he started the zantac also. The lidocaine is not good long-term option and really needs to be on the zantac to treat his sxs  Should f/u in clinic if ongoing issues

## 2017-02-06 NOTE — TELEPHONE ENCOUNTER
Pt is taking Zantac BID, has 7 tablets left.  Today he took Christ[facility] [place] with some relief.    Lidocaine sent    Pt updated on the below, verbalized understanding.  Cathy Perez RN

## 2017-02-06 NOTE — TELEPHONE ENCOUNTER
"Please update patient that his strep culture was negative. Please get update from him on his symptoms. Thanks!    Pt states that he is doing better. Lidocaine liquid po prescribed in the hospital has been effective for him. Last dose was taken last night. Now heart burn is coming back. \"After medication, I can drink water.\"  Pt is requesting a refill for lidocaine.    Please review and advise when able.  Cathy Perez RN    "

## 2017-02-07 NOTE — TELEPHONE ENCOUNTER
[facility] told pt they didn't get the fax for his  lidocaine 15 mL, alum & mag hydroxide-simethicone 15 mL GI Cocktail    Can you refax this please and call pt when it is done  As he is really wanting this filled     Manan [phone] (H)  Ok to leave message  Thank you

## 2017-02-07 NOTE — TELEPHONE ENCOUNTER
Provider to please print, sign and have TC fax. Pharmacy is not receiving when faxed through epic.    Verified that the pharmacy did not receive.    Dania Irene RN, [facility] Triage    Sent to Dr Cobos and a providers on site.

## 2017-02-07 NOTE — TELEPHONE ENCOUNTER
On site providers, please print and sign below rx and have team fax.   Team please update patient when completed.

## 2017-03-16 ENCOUNTER — TELEPHONE (OUTPATIENT)
Dept: FAMILY MEDICINE | Facility: CLINIC | Age: 22
End: 2017-03-16

## 2017-03-16 NOTE — TELEPHONE ENCOUNTER
Patient is due for a Asthma Control Test. The ACT screening form was mailed to home address for patient to review.

## 2017-03-16 NOTE — LETTER
March 16, 2017      Manan Lay  [address]    [place] [facility] [address]        Dear Mnaan Lay,      At [facility] we care about your health and are committed to providing quality patient care. It has come to our attention that you are due for an Asthma Control Test.     This screening tool helps us to assess how well your asthma is controlled. Good asthma control leads to less asthma symptoms and greater health. If your asthma is not in good control (score less than 20) it is recommended you be seen by your provider for medication and lifestyle adjustments    We have enclosed an  Asthma Control Test. Please complete the enclosed asthma control test even if you are feeling well. You can mail it back to our clinic or drop if off at our .     Thank you for your time and we hope this letter finds you well!      Sincerely,    Your Team at [facility]

## 2017-03-24 NOTE — TELEPHONE ENCOUNTER
Joselito Swartz contacted [facility] on 03/24/17 and left a message. If patient calls back please contact care team    Will Maxime Dobson

## 2017-03-31 NOTE — TELEPHONE ENCOUNTER
Joselito Swartz contacted [facility] on 03/31/17 and left a message. If patient calls back please contact care team    Will Maxime ARAIZA    Closing chart as pt has not responded to our attempts to reach him  .